# Patient Record
Sex: FEMALE | URBAN - METROPOLITAN AREA
[De-identification: names, ages, dates, MRNs, and addresses within clinical notes are randomized per-mention and may not be internally consistent; named-entity substitution may affect disease eponyms.]

---

## 2018-09-09 ENCOUNTER — HOSPITAL ENCOUNTER (OUTPATIENT)
Dept: ADMINISTRATIVE | Facility: HOSPITAL | Age: 48
End: 2018-09-10
Attending: INTERNAL MEDICINE | Admitting: INTERNAL MEDICINE

## 2022-04-30 NOTE — DISCHARGE SUMMARY
Patient:   PRADEEP MOHR             MRN: [id number]            FIN: [id number]               Age:   47 years     Sex:  Female     :  1970   Associated Diagnoses:   Benign essential hypertension; Acute chest pain; Chest pain   Author:   Carlitos Pinto MD, Jaime RIVERA      Discharge Information      Discharge Summary Information   Discharged  9/10/2018   Admitting physician     Carlitos Pinto MD, Jaime RIVERA.     Consulting physician     Jp MTZ MD, Nicola BRIDGES     Discharge diagnosis     Benign essential hypertension ([id number]-NC I10).     Acute chest pain (YSP52-VY R07.9).     Chest pain (PNED [id number]).        Physical Examination      Vital Signs (last 24 hrs)_____  Last Charted___________  Temp Oral     36.5 DegC  (SEP 10 07:)  Heart Rate Peripheral   72 bpm  (SEP 10 07:)  Resp Rate         18 br/min  (SEP 10 07:)  SBP      112 mmHg  (SEP 10 07:)  DBP      75 mmHg  (SEP 10 07:)  SpO2      97 %  (SEP 10 07:)  Weight      71.0 kg  (SEP 09 08:43)        Hospital Course   Admitted with atypical CP.  Remained pain free throughout stay.  Serial cardiac enzymes obtained were negative.  Seen by cardiology and recommended outpatient eval after dc if enzymes negative.  Had been off of statin and this was resumed. Followup arranged with cardiology and at my office as well.

## 2022-04-30 NOTE — ED PROVIDER NOTES
"   Patient:   PRADEEP MOHR             MRN: [id number]            FIN: [id number]               Age:   47 years     Sex:  Female     :  1970   Associated Diagnoses:   Acute chest pain   Author:   Taty Licona MD      Basic Information   Time seen: Date & time 2018 06:03:00.   History source: Patient.   Arrival mode: Private vehicle.   History limitation: None.   Additional information: Patient's physician(s): Carlitos Pinto MD, Jaime RIVERA, Chief Complaint from Nursing Triage Note : Chief Complaint   2018 5:03 CDT        Chief Complaint           PT C/O CHEST PAIN THAT STARTED AT 0130. DENIES SOB. PT HAS HX OF HTN  .      History of Present Illness   The patient presents with   46 y/o pt with hx of HTN and HLD presents to ED,accompanied by family, c/o chest pain. Pt states she was sleeping when she began feeling "sharp", substernal pain. The pain started at 0100 and only lasted for 2 minutes before completely going away. Pt went back to sleep until 0400 when the pain returned. The pain lasted for 2 minutes and she began to feel diaphoretic and short of breath. Pt denies any current pain, SOB, or nausea. Pt does report vomiting yesterday morning but none since the pain started. Pt is on her feet all day at work but otherwise is not overly active, no prior exertional symptoms. She denies family hx of heart problems or previous DVT/PE.  The onset was 7  hours ago.  The course/duration of symptoms is episodic: 2  total episodes and lasting 2 minutes.  Location: substernal. Radiating pain: none. The character of symptoms is sharp.  The degree at onset was moderate.  The degree at maximum was moderate.  The degree at present is none.  The exacerbating factor is none.  The relieving factor is none.  Risk factors consist of hypertension and not family history of coronary artery disease.  Prior episodes: none.  Therapy today None.  Associated symptoms: denies shortness of breath, denies nausea and denies " vomiting.        Review of Systems   Constitutional symptoms:  No fever, no chills.    Skin symptoms:  No jaundice, no rash.    Eye symptoms:  Vision unchanged, No blurred vision,    Respiratory symptoms:  No shortness of breath, no orthopnea, no cough, no sputum production.    Cardiovascular symptoms:  Chest pain, moderate pain, sharp, substernal, diaphoresis, no palpitations, no peripheral edema.    Gastrointestinal symptoms:  No abdominal pain, no nausea, no vomiting, no diarrhea, no constipation.    Genitourinary symptoms:  No dysuria, no hematuria.    Neurologic symptoms:  No headache, no dizziness.    Hematologic/Lymphatic symptoms:  Bleeding tendency negative,    Allergy/immunologic symptoms:  No impaired immunity,              Additional review of systems information: All other systems reviewed and otherwise negative.      Health Status   Allergies: No known allergies.   Medications:  (Selected)   Documented Medications  Documented  lisinopril 40 mg oral tablet: 40 mg = 1 tab(s), Oral, Daily, # 30 tab(s), 0 Refill(s).      Past Medical/ Family/ Social History   Medical history:   HTN   HLD.   Surgical history: Negative.   Family history: Not significant.   Social history: Alcohol use: Denies, Occupation: Employed, Family/social situation.      Physical Examination               Vital Signs   Vital Signs   9/9/2018 6:02 CDT        Peripheral Pulse Rate     70 bpm                             Respiratory Rate          15 br/min                             SpO2                      99 %                             Oxygen Therapy            Room air                             Systolic Blood Pressure   100 mmHg                             Diastolic Blood Pressure  65 mmHg                             Mean Arterial Pressure, Cuff              77 mmHg    9/9/2018 5:03 CDT        Temperature Oral          36.5 DegC                             Temperature Oral (calculated)             97.70 DegF                              Peripheral Pulse Rate     74 bpm                             Respiratory Rate          20 br/min                             SpO2                      98 %                             Oxygen Therapy            Room air                             Systolic Blood Pressure   119 mmHg                             Diastolic Blood Pressure  78 mmHg  .   Measurements   9/9/2018 5:03 CDT        Weight Dosing             71.5 kg                             Weight Measured and Calculated in Lbs     157.63 lb                             Weight Estimated          71.5 kg                             Height/Length Dosing      149.8 cm                             Height/Length Estimated   149.8 cm                             Body Mass Index Estimated 31.86 kg/m2  .   Basic Oxygen Information   9/9/2018 6:02 CDT        SpO2                      99 %                             Oxygen Therapy            Room air    9/9/2018 5:03 CDT        SpO2                      98 %                             Oxygen Therapy            Room air  .   General:  Alert, no acute distress.    Skin:  Warm, dry, intact, no pallor.    Head:  Normocephalic, atraumatic.    Neck:  Supple, trachea midline.    Eye:  Normal conjunctiva.   Ears, nose, mouth and throat:  Oral mucosa moist.   Cardiovascular:  Regular rate and rhythm, No murmur, Normal peripheral perfusion, No edema.    Respiratory:  Lungs are clear to auscultation, respirations are non-labored.    Chest wall:  No tenderness, No deformity.    Gastrointestinal:  Soft, Nontender, Non distended, Normal bowel sounds.    Neurological:  Alert and oriented to person, place, time, and situation.   Psychiatric:  Cooperative.      Medical Decision Making   Differential Diagnosis:  Unstable angina, angina, not pulmonary embolism (Well's low risk, PERC negative, doubt clinically significant pulmonary embolus).    Rationale:  patient with history of hypertension with acute chest pain ×2 episodes this  morning, 2nd episode accompanied by shortness of breath and diaphoresis.  Her EKG demonstrates no overt ischemic changes and initial cardiac enzymes are negative.  She is presently pain-free.  Given her risk factors as well as multiple episodes this morning with typical anginal features, aspirin was given and she'll be admitted to the hospital for serial cardiac enzymes.  Admitting team requests cardiology consultation. Findings and plan discussed with the patient, and she is agreeable to admission at this time.   .   Documents reviewed:  Emergency department nurses' notes.   Orders  Launch Order Profile (Selected)   Inpatient Orders  InProcess (In Process)  CBC w/ Auto Diff: STAT collect, 18 5:20:26 CDT, BLOOD, Collected, Stop date 18 5:20:00 CDT, Lab Collect  Ordered  Blood Pressure: 18 5:16:00 CDT, Stop date 18 5:16:00 CDT, Monitor blood pressure.  Cardiac Monitorin18 5:16:00 CDT, Constant Order, Place on telemetry.  Contact MD for order for Aspirin and NTG.: 18 5:16:00 CDT, Contact MD for order for Aspirin and NTG.  Discharge Planning Ongoing Assessment: 18 9:00:00 CDT,   Fall Risk Protocol: 18 5:49:20 CDT, Constant Order  Oxygen Therapy: 18 5:16:00 CDT, Nasal Cannula, Maintain O2 saturation > 93%., CM Oxygen  Pulse Oximetry: 18 5:16:00 CDT, Stop date 18 5:16:00 CDT, Place on pulse oximetry.  Monitor oxygen saturation.  Saline Lock Insert: 18 5:16:00 CDT, Stop date 18 5:16:00 CDT  Ordered (Collected)  POC iSTAT ER Troponin request: BLOOD, STAT collect, Collected, 18 5:20:26 CDT, Stop date 18 5:20:00 CDT, Lab Collect, Print Label By Order Location  Ordered (Exam Completed)  XR Chest 1 View: Stat, 18 5:16:00 CDT, Chest Pain, None, Portable, Rad Type, Not Scheduled, 18 5:16:00 CDT  Completed  Automated Diff: STAT collect, 18 5:20:00 CDT, Blood, Collected, Stop date 18 5:20:00 CDT, Lab Collect,  Print Label By Order Location, 09/09/18 5:16:00 CDT  CMP: STAT collect, 09/09/18 5:20:26 CDT, BLOOD, Collected, Stop date 09/09/18 5:20:00 CDT, Lab Collect  EKG Adult 12 Lead: 09/09/18 5:16:00 CDT, Stat, Chest Pain, 09/09/18 5:16:00 CDT, Show to provider upon completion., -1, -1, 09/09/18 5:16:00 CDT  Estimated Glomerular Filtration Rate: STAT collect, 09/09/18 5:20:00 CDT, Blood, Collected, Stop date 09/09/18 5:20:00 CDT, Lab Collect, Print Label By Order Location, 09/09/18 5:16:00 CDT.   Electrocardiogram:  Time 9/9/2018 05:00:00, rate 73, normal sinus rhythm, EP Interp, STT segments septal Q waves, Previous EKG available No changes, no STEMI.    Results review:  Lab results : Lab View   9/9/2018 5:49 CDT        POC Troponin              0.00 ng/mL    9/9/2018 5:20 CDT        Sodium Lvl                140 mmol/L                             Potassium Lvl             3.9 mmol/L                             Chloride                  105 mmol/L                             CO2                       27.0 mmol/L                             Calcium Lvl               8.7 mg/dL                             Glucose Lvl               110 mg/dL  HI                             BUN                       33.0 mg/dL  HI                             Creatinine                0.80 mg/dL                             eGFR-AA                   >60 mL/min/1.73 m2  NA                             eGFR-MORGAN                  >60 mL/min/1.73 m2  NA                             Bili Total                0.2 mg/dL                             Bili Direct               0.10 mg/dL                             Bili Indirect             0.10 mg/dL                             AST                       25 unit/L                             ALT                       25 unit/L                             Alk Phos                  81 unit/L                             Total Protein             7.6 gm/dL                             Albumin Lvl                3.50 gm/dL                             Globulin                  4.10 gm/dL  HI                             A/G Ratio                 0.9  NA                             WBC                       6.3 x10(3)/mcL                             RBC                       3.32 x10(6)/mcL  LOW                             Hgb                       10.5 gm/dL  LOW                             Hct                       32.0 %  LOW                             Platelet                  213 x10(3)/mcL                             MCV                       96.4 fL  HI                             MCH                       31.6 pg  HI                             MCHC                      32.8 gm/dL  LOW                             RDW                       12.8 %                             MPV                       10.3 fL                             Abs Neut                  3.43 x10(3)/mcL                             Neutro Auto               55 %  NA                             Lymph Auto                36 %  NA                             Mono Auto                 6 %  NA                             Eos Auto                  2 %  NA                             Abs Eos                   0.1 x10(3)/mcL                             Basophil Auto             1 %  NA                             Abs Neutro                3.43 x10(3)/mcL                             Abs Lymph                 2.2 x10(3)/mcL                             Abs Mono                  0.4 x10(3)/mcL                             Abs Baso                  0.0 x10(3)/mcL  , Interpretation Labs unremarkable.    Chest X-Ray:  No acute disease process, interpretation by Emergency Physician, normal cardiomediastinal silhouette, no pneumothorax, no focal infiltrates or pleural effusions, no acute osseous abnormalities on my interpretation.       Reexamination/ Reevaluation   Vital signs   Basic Oxygen Information   9/9/2018 6:02 CDT        SpO2                      99 %                              Oxygen Therapy            Room air    9/9/2018 5:03 CDT        SpO2                      98 %                             Oxygen Therapy            Room air        Impression and Plan   Diagnosis   Acute chest pain (KWB36-CN R07.9)   Plan   Condition: Improved.    Disposition: Admit time  9/9/2018 07:22:00, Place in Observation Telemetry Unit, Carlitos Pinto MD, Jaime RIVERA, case discussed with Dr. Jackie Zhu.    Counseled: Patient, Regarding diagnosis, Regarding diagnostic results, Regarding treatment plan, Patient indicated understanding of instructions, Family understood.    Notes: IMatti, acted solely as a scribe for and in the presence of  who performed the service., I, Taty Licona, have independently performed the history, physical, medical decision making and procedures as documented above and agree with the scribe's documentation. .

## 2022-05-09 ENCOUNTER — LAB REQUISITION (OUTPATIENT)
Dept: LAB | Facility: HOSPITAL | Age: 52
End: 2022-05-09
Payer: COMMERCIAL

## 2022-05-09 DIAGNOSIS — E78.2 MIXED HYPERLIPIDEMIA: ICD-10-CM

## 2022-05-09 LAB
ALBUMIN SERPL-MCNC: 3.9 GM/DL (ref 3.5–5)
ALBUMIN/GLOB SERPL: 1.2 RATIO (ref 1.1–2)
ALP SERPL-CCNC: 95 UNIT/L (ref 40–150)
ALT SERPL-CCNC: 20 UNIT/L (ref 0–55)
AST SERPL-CCNC: 22 UNIT/L (ref 5–34)
BILIRUBIN DIRECT+TOT PNL SERPL-MCNC: 0.2 MG/DL (ref 0–0.5)
BILIRUBIN DIRECT+TOT PNL SERPL-MCNC: 0.3 MG/DL (ref 0–0.8)
BILIRUBIN DIRECT+TOT PNL SERPL-MCNC: 0.5 MG/DL
BUN SERPL-MCNC: 14 MG/DL (ref 9.8–20.1)
CALCIUM SERPL-MCNC: 9.7 MG/DL (ref 8.4–10.2)
CHLORIDE SERPL-SCNC: 104 MMOL/L (ref 98–107)
CHOLEST SERPL-MCNC: 265 MG/DL
CHOLEST/HDLC SERPL: 4 {RATIO} (ref 0–5)
CO2 SERPL-SCNC: 23 MMOL/L (ref 22–29)
CREAT SERPL-MCNC: 0.64 MG/DL (ref 0.55–1.02)
GLOBULIN SER-MCNC: 3.2 GM/DL (ref 2.4–3.5)
GLUCOSE SERPL-MCNC: 112 MG/DL (ref 74–100)
HDLC SERPL-MCNC: 60 MG/DL (ref 35–60)
LDLC SERPL CALC-MCNC: 163 MG/DL (ref 50–140)
POTASSIUM SERPL-SCNC: 4 MMOL/L (ref 3.5–5.1)
PROT SERPL-MCNC: 7.1 GM/DL (ref 6.4–8.3)
SODIUM SERPL-SCNC: 139 MMOL/L (ref 136–145)
TRIGL SERPL-MCNC: 212 MG/DL (ref 37–140)
VLDLC SERPL CALC-MCNC: 42 MG/DL

## 2022-05-09 PROCEDURE — 80053 COMPREHEN METABOLIC PANEL: CPT | Performed by: NURSE PRACTITIONER

## 2022-05-09 PROCEDURE — 80061 LIPID PANEL: CPT | Performed by: NURSE PRACTITIONER

## 2023-09-12 NOTE — H&P
Patient:   PRADEEP MOHR             MRN: [id number]            FIN: [id number]               Age:   47 years     Sex:  Female     :  1970   Associated Diagnoses:   Chest pain; Acute chest pain   Author:   Jackie Zhu MD      Basic Information   Source of history:  Self.    Present at bedside:  Family member.    Referral source:  Self.    History limitation:  None.       Chief Complaint   2018 5:03 CDT        PT C/O CHEST PAIN THAT STARTED AT 0130. DENIES SOB. PT HAS HX OF HTN        History of Present Illness   Ms. Mohr is a 46 yo female with PMH of HTN and HLD who presented to the ED with chest pain. She reports that she started having chest pain at 130 AM that woke hr up from sleep. It went away but came back at 430. She had some sweating and nausea associated with it the second time and proceeded to the ED. She has no history of heart disease or chest pain previously. States the pain was in the center of the chest, sharp, no radiation. She did have a migranie the day before with a few episodes of emesis. She dnies eating any different foods. No chest pain with activity.   Inital labs in the ED are normal. Troponin is normal. CXR read is pending. EKG unremarkable.      Review of Systems   Constitutional:  Negative.    Eye:  Negative.    Ear/Nose/Mouth/Throat:  Negative.    Respiratory:  Negative.    Cardiovascular:       Chest pain: Midsternal.    Gastrointestinal:  Negative.    Genitourinary:  Negative.    Hematology/Lymphatics:  Negative.    Immunologic:  Negative.    Musculoskeletal:  Negative.    Integumentary:  Negative.    Neurologic:  Negative.    Psychiatric:  Negative.    All other systems are negative      Health Status   Allergies:    Allergic Reactions (Selected)  No Known Allergies   Current medications:  (Selected)   Inpatient Medications  Ordered  lisinopril: 40 mg, form: Tab, Oral, Daily, first dose 18 9:00:00 CDT  Documented Medications  Documented  lisinopril 40 mg  oral tablet: 40 mg = 1 tab(s), Oral, Daily, # 30 tab(s), 0 Refill(s)   Problem list:    No qualifying data available        Histories   Past Medical History:    No active or resolved past medical history items have been selected or recorded., HTN and HLD   Family History:    No family history items have been selected or recorded., No family h/o heart disease   Procedure history:    No active procedure history items have been selected or recorded.   Social History        Social & Psychosocial Habits    Tobacco  09/09/2018  Use: Never smoker  .        Physical Examination   General:  Alert and oriented, No acute distress.    Eye:  Pupils are equal, round and reactive to light, Extraocular movements are intact, Normal conjunctiva.    HENT:  Normocephalic.    Neck:  Supple.    Respiratory:  Lungs are clear to auscultation, Respirations are non-labored, Breath sounds are equal, Symmetrical chest wall expansion.    Cardiovascular:  Normal rate, Regular rhythm, No murmur, No gallop, Good pulses equal in all extremities, Normal peripheral perfusion, No edema.    Gastrointestinal:  Soft, Non-tender, Non-distended, Normal bowel sounds, No organomegaly.    Vital Signs   9/9/2018 8:00 CDT        Peripheral Pulse Rate     77 bpm                             Respiratory Rate          17 br/min                             SpO2                      99 %                             Oxygen Therapy            Room air                             Systolic Blood Pressure   112 mmHg                             Diastolic Blood Pressure  62 mmHg                             Mean Arterial Pressure, Cuff              79 mmHg    9/9/2018 6:02 CDT        Peripheral Pulse Rate     70 bpm                             Respiratory Rate          15 br/min                             SpO2                      99 %                             Oxygen Therapy            Room air                             Systolic Blood Pressure   100 mmHg                              Diastolic Blood Pressure  65 mmHg                             Mean Arterial Pressure, Cuff              77 mmHg    9/9/2018 5:03 CDT        Temperature Oral          36.5 DegC                             Temperature Oral (calculated)             97.70 DegF                             Peripheral Pulse Rate     74 bpm                             Respiratory Rate          20 br/min                             SpO2                      98 %                             Oxygen Therapy            Room air                             Systolic Blood Pressure   119 mmHg                             Diastolic Blood Pressure  78 mmHg     Musculoskeletal:  No tenderness, No swelling, No deformity.    Integumentary:  Warm, Dry, Pink, Intact.    Neurologic:  Alert, Oriented, No focal deficits.    Cognition and Speech:  Oriented, Speech clear and coherent, Functional cognition intact.    Psychiatric:  Cooperative, Appropriate mood & affect, Normal judgment.       Review / Management   Laboratory Results   Today's Lab Results : PowerNote Discrete Results   9/9/2018 5:49 CDT        POC Troponin              0.00 ng/mL    9/9/2018 5:20 CDT        WBC                       6.3 x10(3)/mcL                             RBC                       3.32 x10(6)/mcL  LOW                             Hgb                       10.5 gm/dL  LOW                             Hct                       32.0 %  LOW                             Platelet                  213 x10(3)/mcL                             MCV                       96.4 fL  HI                             MCH                       31.6 pg  HI                             MCHC                      32.8 gm/dL  LOW                             RDW                       12.8 %                             MPV                       10.3 fL                             Abs Neut                  3.43 x10(3)/mcL                             Neutro Auto               55 %  NA                              Lymph Auto                36 %  NA                             Mono Auto                 6 %  NA                             Eos Auto                  2 %  NA                             Abs Eos                   0.1 x10(3)/mcL                             Basophil Auto             1 %  NA                             Abs Neutro                3.43 x10(3)/mcL                             Abs Lymph                 2.2 x10(3)/mcL                             Abs Mono                  0.4 x10(3)/mcL                             Abs Baso                  0.0 x10(3)/mcL                             Sodium Lvl                140 mmol/L                             Potassium Lvl             3.9 mmol/L                             Chloride                  105 mmol/L                             CO2                       27.0 mmol/L                             Calcium Lvl               8.7 mg/dL                             Glucose Lvl               110 mg/dL  HI                             BUN                       33.0 mg/dL  HI                             Creatinine                0.80 mg/dL                             eGFR-AA                   >60 mL/min/1.73 m2  NA                             eGFR-MORGAN                  >60 mL/min/1.73 m2  NA                             Bili Total                0.2 mg/dL                             Bili Direct               0.10 mg/dL                             Bili Indirect             0.10 mg/dL                             AST                       25 unit/L                             ALT                       25 unit/L                             Alk Phos                  81 unit/L                             Total Protein             7.6 gm/dL                             Albumin Lvl               3.50 gm/dL                             Globulin                  4.10 gm/dL  HI                             A/G Ratio                 0.9  NA           Impression and Plan    Diagnosis     Chest pain (PNED [id number]).     Acute chest pain (CIQ46-QG R07.9).     Orders     1. Chest pain - repeat cardiac enzymes ordered. cardiology consulted  2. HTN - continue BP meds, monitor  3. HLD - not currently onstatin will order cholesterol for in the morning  4. GERD - PPI ordered    .      Administered By (Optional): Hailey Luna NP